# Patient Record
Sex: MALE | Race: BLACK OR AFRICAN AMERICAN | Employment: FULL TIME | URBAN - METROPOLITAN AREA
[De-identification: names, ages, dates, MRNs, and addresses within clinical notes are randomized per-mention and may not be internally consistent; named-entity substitution may affect disease eponyms.]

---

## 2022-05-05 ENCOUNTER — APPOINTMENT (OUTPATIENT)
Dept: GENERAL RADIOLOGY | Age: 52
End: 2022-05-05
Attending: PHYSICIAN ASSISTANT
Payer: COMMERCIAL

## 2022-05-05 ENCOUNTER — HOSPITAL ENCOUNTER (EMERGENCY)
Age: 52
Discharge: HOME | End: 2022-05-05
Attending: EMERGENCY MEDICINE
Payer: COMMERCIAL

## 2022-05-05 ENCOUNTER — APPOINTMENT (OUTPATIENT)
Dept: CT IMAGING | Age: 52
End: 2022-05-05
Attending: PHYSICIAN ASSISTANT
Payer: COMMERCIAL

## 2022-05-05 VITALS
HEIGHT: 66 IN | WEIGHT: 231.48 LBS | OXYGEN SATURATION: 97 % | TEMPERATURE: 98 F | SYSTOLIC BLOOD PRESSURE: 148 MMHG | DIASTOLIC BLOOD PRESSURE: 90 MMHG | BODY MASS INDEX: 37.2 KG/M2 | HEART RATE: 78 BPM | RESPIRATION RATE: 16 BRPM

## 2022-05-05 DIAGNOSIS — M54.9 UPPER BACK PAIN: ICD-10-CM
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
## 2022-05-05 PROCEDURE — 71046 X-RAY EXAM CHEST 2 VIEWS: CPT

## 2022-05-05 PROCEDURE — 99284 EMERGENCY DEPT VISIT MOD MDM: CPT

## 2022-05-05 PROCEDURE — 72050 X-RAY EXAM NECK SPINE 4/5VWS: CPT

## 2022-05-05 PROCEDURE — 72072 X-RAY EXAM THORAC SPINE 3VWS: CPT

## 2022-05-05 PROCEDURE — 70450 CT HEAD/BRAIN W/O DYE: CPT

## 2022-05-05 PROCEDURE — 74011250637 HC RX REV CODE- 250/637: Performed by: PHYSICIAN ASSISTANT

## 2022-05-05 PROCEDURE — 72100 X-RAY EXAM L-S SPINE 2/3 VWS: CPT

## 2022-05-05 RX ORDER — IBUPROFEN 600 MG/1
600 TABLET ORAL
Status: COMPLETED | OUTPATIENT
Start: 2022-05-05 | End: 2022-05-05

## 2022-05-05 RX ADMIN — IBUPROFEN 600 MG: 600 TABLET ORAL at 12:37

## 2022-05-05 NOTE — Clinical Note
[address]  [address] [facility] [address]  [phone]    Work/School Note    Date: 5/5/2022    To Whom It May concern:    Lesvia Mata was seen and treated today in the emergency room by the following provider(s):  Attending Provider: Adolph Gallagher MD  Physician Assistant: LEIDY Naranjo. Lesvia Mata is excused from work/school on 05/05/22 and 05/06/22. He is medically clear to return to work/school on 5/7/2022.        Sincerely,          Jina Joy RN

## 2022-05-05 NOTE — Clinical Note
[address]  [address] [facility] [address]  [phone]    Work/School Note    Date: 5/5/2022    To Whom It May concern:    Marva Sandra was seen and treated today in the emergency room by the following provider(s):  Attending Provider: Siddhartha Hollis MD  Physician Assistant: LEIDY Borges. Marva Sandra is excused from work/school on 05/05/22 and 05/06/22. He is medically clear to return to work/school on 5/7/2022.        Sincerely,          LEIDY Garcia
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
